# Patient Record
Sex: MALE | Race: BLACK OR AFRICAN AMERICAN | NOT HISPANIC OR LATINO | URBAN - METROPOLITAN AREA
[De-identification: names, ages, dates, MRNs, and addresses within clinical notes are randomized per-mention and may not be internally consistent; named-entity substitution may affect disease eponyms.]

---

## 2020-02-26 ENCOUNTER — TELEPHONE (OUTPATIENT)
Dept: OPHTHALMOLOGY | Facility: CLINIC | Age: 35
End: 2020-02-26

## 2020-02-26 NOTE — TELEPHONE ENCOUNTER
Called to schedule evaluation for keratoconus with dr. Nguyễn. Patient did not answer left message for patient to call back to get the appointment scheduled at his earliest convenience     ----- Message from Fiona Chapin sent at 2/26/2020  9:24 AM CST -----  Contact: Dr. Mora [facility]   Hey,  Can you please call this patient and schedule Keratoconus consult with Dr. Nguyễn?  Thank you,  Lopez  ----- Message -----  From: Denae Sharma  Sent: 2/20/2020   1:31 PM CST  To: Fiona Chapin    :(     We are booked out    ----- Message -----  From: Evita Loyd  Sent: 2/20/2020   9:57 AM CST  To: [facility]    Calling to schedule pt for keratoconus eval for crosslinking procedure.  They are asking that you contact the patient to schedule for a date and time convenient.  He can be reached at [phone].  I could not schedule due to the medicaid insurance (no available time slots)

## 2020-02-26 NOTE — TELEPHONE ENCOUNTER
----- Message from Jessica Simmons sent at 2/26/2020 10:56 AM CST -----  Contact: pt: [phone]  Pt is returning a call to the clinic to schedule appt with Dr. Nguyễn    Please contact pt: [phone]

## 2020-03-10 ENCOUNTER — TELEPHONE (OUTPATIENT)
Dept: OPHTHALMOLOGY | Facility: CLINIC | Age: 35
End: 2020-03-10

## 2020-03-10 NOTE — TELEPHONE ENCOUNTER
----- Message from Evita Loyd sent at 3/10/2020  3:23 PM CDT -----  Contact: Jovanny Benavides calling because he is still one hour and a half away and cannot make it in to see us today.  He would like to know if he can be fitted in on tomorrow because they are coming from a far distance.  Please contact them at [phone]

## 2020-03-10 NOTE — TELEPHONE ENCOUNTER
----- Message from Sahra Fuller sent at 3/10/2020  2:01 PM CDT -----  Contact: PT   PT called and said he was in an accident on the way to the appointment and is going to be late. Couldn't tell me just how late so the appointment might need to be rescheduled.     Callback: [phone]

## 2020-03-16 ENCOUNTER — TELEPHONE (OUTPATIENT)
Dept: OPHTHALMOLOGY | Facility: CLINIC | Age: 35
End: 2020-03-16

## 2020-03-25 ENCOUNTER — TELEPHONE (OUTPATIENT)
Dept: OPHTHALMOLOGY | Facility: CLINIC | Age: 35
End: 2020-03-25

## 2020-04-22 ENCOUNTER — TELEPHONE (OUTPATIENT)
Dept: OPHTHALMOLOGY | Facility: CLINIC | Age: 35
End: 2020-04-22

## 2020-04-23 ENCOUNTER — TELEPHONE (OUTPATIENT)
Dept: OPHTHALMOLOGY | Facility: CLINIC | Age: 35
End: 2020-04-23

## 2020-04-27 ENCOUNTER — TELEPHONE (OUTPATIENT)
Dept: OPHTHALMOLOGY | Facility: CLINIC | Age: 35
End: 2020-04-27

## 2020-04-27 NOTE — TELEPHONE ENCOUNTER
Left message again for patient to call and reschedule appointment for 04/28 due to COVID concerns.

## 2020-05-26 ENCOUNTER — OFFICE VISIT (OUTPATIENT)
Dept: OPHTHALMOLOGY | Facility: CLINIC | Age: 35
End: 2020-05-26
Attending: OPHTHALMOLOGY
Payer: MEDICAID

## 2020-05-26 ENCOUNTER — TELEPHONE (OUTPATIENT)
Dept: OPHTHALMOLOGY | Facility: CLINIC | Age: 35
End: 2020-05-26

## 2020-05-26 DIAGNOSIS — Q12.9 CONGENITAL CATARACT OR LENS ANOMALY: Primary | ICD-10-CM

## 2020-05-26 DIAGNOSIS — H52.13 MYOPIA OF BOTH EYES: ICD-10-CM

## 2020-05-26 DIAGNOSIS — Q12.0 CONGENITAL CATARACT OR LENS ANOMALY: Primary | ICD-10-CM

## 2020-05-26 PROCEDURE — 92002 INTRM OPH EXAM NEW PATIENT: CPT | Mod: S$PBB,,, | Performed by: OPHTHALMOLOGY

## 2020-05-26 PROCEDURE — 92002 PR EYE EXAM, NEW PATIENT,INTERMED: ICD-10-PCS | Mod: S$PBB,,, | Performed by: OPHTHALMOLOGY

## 2020-05-26 PROCEDURE — 99212 OFFICE O/P EST SF 10 MIN: CPT | Mod: PBBFAC | Performed by: OPHTHALMOLOGY

## 2020-05-26 PROCEDURE — 99999 PR PBB SHADOW E&M-EST. PATIENT-LVL II: CPT | Mod: PBBFAC,,, | Performed by: OPHTHALMOLOGY

## 2020-05-26 PROCEDURE — 99999 PR PBB SHADOW E&M-EST. PATIENT-LVL II: ICD-10-PCS | Mod: PBBFAC,,, | Performed by: OPHTHALMOLOGY

## 2020-05-26 RX ORDER — ALLOPURINOL 100 MG/1
TABLET ORAL
COMMUNITY

## 2020-05-26 RX ORDER — INSULIN GLARGINE 100 [IU]/ML
INJECTION, SOLUTION SUBCUTANEOUS
COMMUNITY

## 2020-05-26 RX ORDER — GABAPENTIN 300 MG/1
CAPSULE ORAL
COMMUNITY

## 2020-05-26 RX ORDER — METFORMIN HYDROCHLORIDE 1000 MG/1
TABLET ORAL
COMMUNITY

## 2020-05-26 RX ORDER — CLONIDINE HYDROCHLORIDE 0.2 MG/1
TABLET ORAL
COMMUNITY

## 2020-05-26 RX ORDER — INSULIN ASPART 100 [IU]/ML
INJECTION, SOLUTION INTRAVENOUS; SUBCUTANEOUS
COMMUNITY

## 2020-05-26 NOTE — TELEPHONE ENCOUNTER
----- Message from Angela Mares sent at 5/26/2020  2:43 PM CDT -----  Contact: Dr Colbert office  Dr. Colbert office would like to get last office notes fax to them.    Fax# [phone]

## 2020-05-26 NOTE — PROGRESS NOTES
HPI     Referred By: [facility] (Dr. Acuña)     Patient here today for a New Patient Evaluation For Keratoconus     Patient reports that his vision is blurry at distance with gradual   decreasing. Patient reports that he was seen By Dr. Tammie acuña for a   LASIk she then sent him here for keratoconus and to see about   cross-linking OU     History:   Myopia   Astigmatism     Last edited by Benigno Sanchez on 5/26/2020  1:36 PM. (History)            Assessment /Plan     For exam results, see Encounter Report.    Congenital cataract or lens anomaly    Myopia of both eyes      Needs MRx for glasses  Not a refractive candidate

## 2020-05-27 ENCOUNTER — TELEPHONE (OUTPATIENT)
Dept: OPTOMETRY | Facility: CLINIC | Age: 35
End: 2020-05-27

## 2020-05-27 NOTE — TELEPHONE ENCOUNTER
Per Dr. Nguyễn refraction needed. Wanted [facility] location  Scheduled next available   07/09 @ 320